# Patient Record
Sex: FEMALE | Race: WHITE | URBAN - METROPOLITAN AREA
[De-identification: names, ages, dates, MRNs, and addresses within clinical notes are randomized per-mention and may not be internally consistent; named-entity substitution may affect disease eponyms.]

---

## 2022-09-16 DIAGNOSIS — Z12.31 OTHER SCREENING MAMMOGRAM: ICD-10-CM

## 2022-11-22 DIAGNOSIS — N63.20 MASS OF LEFT BREAST, UNSPECIFIED QUADRANT: Primary | ICD-10-CM

## 2023-05-15 ENCOUNTER — TELEPHONE (OUTPATIENT)
Dept: OBGYN CLINIC | Age: 48
End: 2023-05-15

## 2023-06-05 SDOH — ECONOMIC STABILITY: FOOD INSECURITY: WITHIN THE PAST 12 MONTHS, THE FOOD YOU BOUGHT JUST DIDN'T LAST AND YOU DIDN'T HAVE MONEY TO GET MORE.: NEVER TRUE

## 2023-06-05 SDOH — ECONOMIC STABILITY: INCOME INSECURITY: HOW HARD IS IT FOR YOU TO PAY FOR THE VERY BASICS LIKE FOOD, HOUSING, MEDICAL CARE, AND HEATING?: NOT HARD AT ALL

## 2023-06-05 SDOH — ECONOMIC STABILITY: TRANSPORTATION INSECURITY
IN THE PAST 12 MONTHS, HAS LACK OF TRANSPORTATION KEPT YOU FROM MEETINGS, WORK, OR FROM GETTING THINGS NEEDED FOR DAILY LIVING?: NO

## 2023-06-05 SDOH — ECONOMIC STABILITY: HOUSING INSECURITY
IN THE LAST 12 MONTHS, WAS THERE A TIME WHEN YOU DID NOT HAVE A STEADY PLACE TO SLEEP OR SLEPT IN A SHELTER (INCLUDING NOW)?: NO

## 2023-06-05 SDOH — ECONOMIC STABILITY: FOOD INSECURITY: WITHIN THE PAST 12 MONTHS, YOU WORRIED THAT YOUR FOOD WOULD RUN OUT BEFORE YOU GOT MONEY TO BUY MORE.: NEVER TRUE

## 2023-06-06 ENCOUNTER — OFFICE VISIT (OUTPATIENT)
Dept: OBGYN CLINIC | Age: 48
End: 2023-06-06
Payer: COMMERCIAL

## 2023-06-06 VITALS
HEIGHT: 65 IN | DIASTOLIC BLOOD PRESSURE: 78 MMHG | BODY MASS INDEX: 28.16 KG/M2 | WEIGHT: 169 LBS | SYSTOLIC BLOOD PRESSURE: 124 MMHG

## 2023-06-06 DIAGNOSIS — Z12.4 SCREENING FOR CERVICAL CANCER: ICD-10-CM

## 2023-06-06 DIAGNOSIS — Z12.31 ENCOUNTER FOR SCREENING MAMMOGRAM FOR MALIGNANT NEOPLASM OF BREAST: ICD-10-CM

## 2023-06-06 DIAGNOSIS — Z01.419 WELL WOMAN EXAM WITH ROUTINE GYNECOLOGICAL EXAM: Primary | ICD-10-CM

## 2023-06-06 DIAGNOSIS — N95.1 PERIMENOPAUSE: ICD-10-CM

## 2023-06-06 DIAGNOSIS — N80.9 ENDOMETRIOSIS, UNSPECIFIED: ICD-10-CM

## 2023-06-06 DIAGNOSIS — N95.1 MENOPAUSAL SYMPTOMS: ICD-10-CM

## 2023-06-06 PROCEDURE — 99396 PREV VISIT EST AGE 40-64: CPT | Performed by: OBSTETRICS & GYNECOLOGY

## 2023-06-06 PROCEDURE — 99214 OFFICE O/P EST MOD 30 MIN: CPT | Performed by: OBSTETRICS & GYNECOLOGY

## 2023-06-06 RX ORDER — ESTRADIOL 0.05 MG/D
1 PATCH, EXTENDED RELEASE TRANSDERMAL
Qty: 24 PATCH | Refills: 4 | Status: SHIPPED | OUTPATIENT
Start: 2023-06-08

## 2023-06-06 RX ORDER — PROGESTERONE 100 MG/1
100 CAPSULE ORAL DAILY
Qty: 90 CAPSULE | Refills: 4 | Status: SHIPPED | OUTPATIENT
Start: 2023-06-06

## 2023-06-06 ASSESSMENT — ENCOUNTER SYMPTOMS
GASTROINTESTINAL NEGATIVE: 1
ABDOMINAL PAIN: 0
BLOOD IN STOOL: 0
COUGH: 0
EYES NEGATIVE: 1
RESPIRATORY NEGATIVE: 1
SHORTNESS OF BREATH: 0
ALLERGIC/IMMUNOLOGIC NEGATIVE: 1

## 2023-06-06 ASSESSMENT — PATIENT HEALTH QUESTIONNAIRE - PHQ9
SUM OF ALL RESPONSES TO PHQ9 QUESTIONS 1 & 2: 0
SUM OF ALL RESPONSES TO PHQ QUESTIONS 1-9: 0
SUM OF ALL RESPONSES TO PHQ QUESTIONS 1-9: 0
2. FEELING DOWN, DEPRESSED OR HOPELESS: 0
SUM OF ALL RESPONSES TO PHQ QUESTIONS 1-9: 0
SUM OF ALL RESPONSES TO PHQ QUESTIONS 1-9: 0
1. LITTLE INTEREST OR PLEASURE IN DOING THINGS: 0

## 2023-06-06 NOTE — PROGRESS NOTES
Nathalie Chisholm is 41 y.o. female, No obstetric history on file., who presents today for a routine annual gynecological examination. Patient's last menstrual period was 2023 (exact date). . Lucius Martin, feels \"perimenopausal\", cycles irregular, frequent hot flashes, lower energy and libido. Having trouble sleeping. Endo symptoms OK at present, no change. Ow Doing well. No Gyn, Breast, G/U, or GI complaints. Had normal colo 6 months ago. Mammogram/Pap Hx 2023   Mammogram Date 10/17/2022   Mammogram Result w/L US- benign   Pap Date 2022   Pap Result neg     GYN Intake Questionnaire 2023   Current Form of Contraception Vasectomy   Menarche 17   Period Cycle Irregular   Period Duration in Days (No Data)   Period Pattern Irregular   Menstrual Flow Light   Period Control Tampon Light; Thin Pad   Frequency of Change for Flow Control 3 x d   Dysmenorrhea None   Dyspareunia Deep   Post-Coital Bleeding None   Date of Mammogram 10/17/2022   Result of Mammogram w/L US- benign   Date of Pap 2022   Pap result neg       Contraception: vasectomy  Has received Gardisil: NO  Last [place] 2023 WNL   Last time cholesterol was checked: 11/10/22     OB History:   OB History    Para Term  AB Living   4   2   2 2   SAB IAB Ectopic Molar Multiple Live Births                       Health Maintenance Due   Topic Date Due    Depression Screen  Never done    HIV screen  Never done    Hepatitis C screen  Never done    DTaP/Tdap/Td vaccine (1 - Tdap) Never done    Diabetes screen  Never done    Lipids  Never done    COVID-19 Vaccine (3 - Booster for Lambert Peter series) 2021    Colorectal Cancer Screen  Never done         GYN History            Lainey Glez  has a past medical history of Depression, Endometriosis, LSIL (low grade squamous intraepithelial lesion) on Pap smear, and ARON (stress urinary incontinence, female).  .    Her surgeries include  has a past surgical history that includes orthopedic surgery

## 2023-06-09 LAB
CYTOLOGIST CVX/VAG CYTO: NORMAL
CYTOLOGY CVX/VAG DOC THIN PREP: NORMAL
HPV REFLEX: NORMAL
Lab: NORMAL
PATH REPORT.FINAL DX SPEC: NORMAL
STAT OF ADQ CVX/VAG CYTO-IMP: NORMAL

## 2024-02-09 RX ORDER — NORETHINDRONE ACETATE AND ETHINYL ESTRADIOL 1MG-20(21)
1 KIT ORAL DAILY
Qty: 28 TABLET | OUTPATIENT
Start: 2024-02-09

## 2024-02-12 ENCOUNTER — PATIENT MESSAGE (OUTPATIENT)
Dept: OBGYN CLINIC | Age: 49
End: 2024-02-12

## 2024-02-12 DIAGNOSIS — N80.9 ENDOMETRIOSIS: Primary | ICD-10-CM

## 2024-02-15 RX ORDER — NORETHINDRONE ACETATE AND ETHINYL ESTRADIOL 1MG-20(21)
1 KIT ORAL DAILY
Qty: 3 PACKET | Refills: 1 | Status: SHIPPED | OUTPATIENT
Start: 2024-02-15

## 2024-02-15 NOTE — TELEPHONE ENCOUNTER
From: Fidelina Cottrell  To: Dr. Constantino Hall  Sent: 2/12/2024 4:37 PM EST  Subject: Birth Control Pills    Hi. I have been off my birth control pills for a year now and I wanted to know if I could get back on them? My endo symptoms seem to be getting worse since I stopped taking them. Thanks

## 2024-07-30 SDOH — ECONOMIC STABILITY: INCOME INSECURITY: HOW HARD IS IT FOR YOU TO PAY FOR THE VERY BASICS LIKE FOOD, HOUSING, MEDICAL CARE, AND HEATING?: NOT HARD AT ALL

## 2024-07-30 SDOH — ECONOMIC STABILITY: FOOD INSECURITY: WITHIN THE PAST 12 MONTHS, THE FOOD YOU BOUGHT JUST DIDN'T LAST AND YOU DIDN'T HAVE MONEY TO GET MORE.: NEVER TRUE

## 2024-07-30 SDOH — ECONOMIC STABILITY: FOOD INSECURITY: WITHIN THE PAST 12 MONTHS, YOU WORRIED THAT YOUR FOOD WOULD RUN OUT BEFORE YOU GOT MONEY TO BUY MORE.: NEVER TRUE

## 2024-07-31 ENCOUNTER — OFFICE VISIT (OUTPATIENT)
Dept: OBGYN CLINIC | Age: 49
End: 2024-07-31
Payer: COMMERCIAL

## 2024-07-31 VITALS
DIASTOLIC BLOOD PRESSURE: 86 MMHG | WEIGHT: 175 LBS | SYSTOLIC BLOOD PRESSURE: 136 MMHG | HEIGHT: 65 IN | BODY MASS INDEX: 29.16 KG/M2

## 2024-07-31 DIAGNOSIS — Z01.419 WELL WOMAN EXAM WITH ROUTINE GYNECOLOGICAL EXAM: Primary | ICD-10-CM

## 2024-07-31 DIAGNOSIS — Z12.4 CERVICAL CANCER SCREENING: ICD-10-CM

## 2024-07-31 DIAGNOSIS — Z01.419 WELL WOMAN EXAM WITH ROUTINE GYNECOLOGICAL EXAM: ICD-10-CM

## 2024-07-31 DIAGNOSIS — N95.1 MENOPAUSAL SYMPTOMS: ICD-10-CM

## 2024-07-31 DIAGNOSIS — Z12.31 ENCOUNTER FOR SCREENING MAMMOGRAM FOR MALIGNANT NEOPLASM OF BREAST: ICD-10-CM

## 2024-07-31 DIAGNOSIS — N80.9 ENDOMETRIOSIS: ICD-10-CM

## 2024-07-31 PROCEDURE — 99396 PREV VISIT EST AGE 40-64: CPT | Performed by: OBSTETRICS & GYNECOLOGY

## 2024-07-31 PROCEDURE — 99459 PELVIC EXAMINATION: CPT | Performed by: OBSTETRICS & GYNECOLOGY

## 2024-07-31 RX ORDER — ESTRADIOL 0.05 MG/D
1 PATCH, EXTENDED RELEASE TRANSDERMAL
Qty: 24 PATCH | Refills: 4 | Status: SHIPPED | OUTPATIENT
Start: 2024-08-01

## 2024-07-31 RX ORDER — PROGESTERONE 100 MG/1
100 CAPSULE ORAL DAILY
Qty: 90 CAPSULE | Refills: 4 | Status: SHIPPED | OUTPATIENT
Start: 2024-07-31

## 2024-07-31 ASSESSMENT — ENCOUNTER SYMPTOMS
RESPIRATORY NEGATIVE: 1
ABDOMINAL PAIN: 0
BLOOD IN STOOL: 0
COUGH: 0
ALLERGIC/IMMUNOLOGIC NEGATIVE: 1
EYES NEGATIVE: 1
SHORTNESS OF BREATH: 0
GASTROINTESTINAL NEGATIVE: 1

## 2024-07-31 ASSESSMENT — PATIENT HEALTH QUESTIONNAIRE - PHQ9
SUM OF ALL RESPONSES TO PHQ QUESTIONS 1-9: 0
2. FEELING DOWN, DEPRESSED OR HOPELESS: NOT AT ALL
1. LITTLE INTEREST OR PLEASURE IN DOING THINGS: NOT AT ALL
SUM OF ALL RESPONSES TO PHQ QUESTIONS 1-9: 0
SUM OF ALL RESPONSES TO PHQ9 QUESTIONS 1 & 2: 0

## 2024-07-31 NOTE — PROGRESS NOTES
Fidelina Cottrell is 49 y.o. female, No obstetric history on file., who presents today for a routine annual gynecological examination.Patient's last menstrual period was 2024 (exact date). . Happy with HRT.  Not using the patch as much. Doing well.  No Gyn, Breast, G/U, or GI complaints.       Last Mammogram 2023   - result: Normal  Date of last Cervical Cancer screen (HPV or PAP): 2023    11:00 AM   Mammogram/Pap Hx   Mammogram Date 10/17/2022   Mammogram Result w/L US- benign   Pap Date 2022   Pap Result neg         2023    11:00 AM   GYN Intake Questionnaire   Current Form of Contraception Vasectomy   Menarche 17   Period Cycle Irregular   Period Pattern Irregular   Menstrual Flow Light   Period Control Tampon Light;Thin Pad   Frequency of Change for Flow Control 3 x d   Dysmenorrhea None   Dyspareunia Deep   Post-Coital Bleeding None   Date of Mammogram 10/17/2022   Result of Mammogram w/L US- benign   Date of Pap 2022   Pap result neg       Contraception: Vasectomy  Has received Gardisil: NO  Last [place] : 2024, repeat in 10 years  No cologuard on file  No FIT/FOBT on file   No flexible sigmoidoscopy on file  Last time cholesterol was checked: 1 years ago    OB History:   OB History    Para Term  AB Living   6 4 4   2 2   SAB IAB Ectopic Molar Multiple Live Births   2         2      # Outcome Date GA Lbr Gary/2nd Weight Sex Delivery Anes PTL Lv   6 2012              Birth Comments: D&E   5 Term    4.054 kg (8 lb 15 oz) M  EPI  RUDDY   4 Term  39w0d  3.714 kg (8 lb 3 oz) F    RUDDY   3 SAB            2 Term            1 Term                  Health Maintenance Due   Topic Date Due    Hepatitis B vaccine (1 of 3 - 3-dose series) Never done    HIV screen  Never done    Hepatitis C screen  Never done    DTaP/Tdap/Td vaccine (1 - Tdap) Never done    Diabetes screen  Never done    Lipids  Never done    Colorectal Cancer Screen  Never done    COVID-19

## 2024-08-05 LAB
COLLECTION METHOD: NORMAL
CYTOLOGIST CVX/VAG CYTO: NORMAL
CYTOLOGY CVX/VAG DOC THIN PREP: NORMAL
DATE OF LMP: NORMAL
HPV REFLEX: NORMAL
Lab: NORMAL
OTHER PT INFO: NORMAL
PAP SOURCE: NORMAL
PATH REPORT.FINAL DX SPEC: NORMAL
PREV CYTO INFO: NORMAL
PREV TREATMENT RESULTS: NORMAL
PREV TREATMENT: NORMAL
STAT OF ADQ CVX/VAG CYTO-IMP: NORMAL

## 2024-10-23 ENCOUNTER — PATIENT MESSAGE (OUTPATIENT)
Dept: OBGYN CLINIC | Age: 49
End: 2024-10-23

## 2025-08-05 ENCOUNTER — OFFICE VISIT (OUTPATIENT)
Dept: OBGYN CLINIC | Age: 50
End: 2025-08-05
Payer: COMMERCIAL

## 2025-08-05 VITALS
WEIGHT: 177 LBS | DIASTOLIC BLOOD PRESSURE: 70 MMHG | BODY MASS INDEX: 29.49 KG/M2 | HEIGHT: 65 IN | SYSTOLIC BLOOD PRESSURE: 118 MMHG

## 2025-08-05 DIAGNOSIS — Z01.419 WELL WOMAN EXAM WITH ROUTINE GYNECOLOGICAL EXAM: Primary | ICD-10-CM

## 2025-08-05 DIAGNOSIS — Z12.4 CERVICAL CANCER SCREENING: ICD-10-CM

## 2025-08-05 DIAGNOSIS — N95.1 MENOPAUSAL SYMPTOMS: ICD-10-CM

## 2025-08-05 DIAGNOSIS — Z12.31 BREAST CANCER SCREENING BY MAMMOGRAM: ICD-10-CM

## 2025-08-05 PROCEDURE — 99459 PELVIC EXAMINATION: CPT | Performed by: OBSTETRICS & GYNECOLOGY

## 2025-08-05 PROCEDURE — 99396 PREV VISIT EST AGE 40-64: CPT | Performed by: OBSTETRICS & GYNECOLOGY

## 2025-08-05 RX ORDER — ESTRADIOL 0.05 MG/D
1 PATCH, EXTENDED RELEASE TRANSDERMAL
Qty: 24 PATCH | Refills: 4 | Status: SHIPPED | OUTPATIENT
Start: 2025-08-07

## 2025-08-05 RX ORDER — PROGESTERONE 100 MG/1
100 CAPSULE ORAL DAILY
Qty: 90 CAPSULE | Refills: 4 | Status: SHIPPED | OUTPATIENT
Start: 2025-08-05

## 2025-08-05 SDOH — ECONOMIC STABILITY: FOOD INSECURITY: WITHIN THE PAST 12 MONTHS, THE FOOD YOU BOUGHT JUST DIDN'T LAST AND YOU DIDN'T HAVE MONEY TO GET MORE.: NEVER TRUE

## 2025-08-05 SDOH — ECONOMIC STABILITY: FOOD INSECURITY: WITHIN THE PAST 12 MONTHS, YOU WORRIED THAT YOUR FOOD WOULD RUN OUT BEFORE YOU GOT MONEY TO BUY MORE.: NEVER TRUE

## 2025-08-05 ASSESSMENT — ENCOUNTER SYMPTOMS
ABDOMINAL PAIN: 0
ALLERGIC/IMMUNOLOGIC NEGATIVE: 1
RESPIRATORY NEGATIVE: 1
BLOOD IN STOOL: 0
COUGH: 0
GASTROINTESTINAL NEGATIVE: 1
SHORTNESS OF BREATH: 0
EYES NEGATIVE: 1

## 2025-08-05 ASSESSMENT — PATIENT HEALTH QUESTIONNAIRE - PHQ9
SUM OF ALL RESPONSES TO PHQ QUESTIONS 1-9: 0
2. FEELING DOWN, DEPRESSED OR HOPELESS: NOT AT ALL
SUM OF ALL RESPONSES TO PHQ QUESTIONS 1-9: 0
1. LITTLE INTEREST OR PLEASURE IN DOING THINGS: NOT AT ALL
SUM OF ALL RESPONSES TO PHQ QUESTIONS 1-9: 0
SUM OF ALL RESPONSES TO PHQ QUESTIONS 1-9: 0

## 2025-08-07 LAB
COLLECTION METHOD: NORMAL
CYTOLOGIST CVX/VAG CYTO: NORMAL
CYTOLOGY CVX/VAG DOC THIN PREP: NORMAL
DATE OF LMP: NORMAL
HPV REFLEX: NORMAL
Lab: NORMAL
OTHER PT INFO: NORMAL
PAP SOURCE: NORMAL
PATH REPORT.FINAL DX SPEC: NORMAL
PREV CYTO INFO: NEGATIVE
PREV TREATMENT: NORMAL
STAT OF ADQ CVX/VAG CYTO-IMP: NORMAL